# Patient Record
Sex: MALE | Race: WHITE
[De-identification: names, ages, dates, MRNs, and addresses within clinical notes are randomized per-mention and may not be internally consistent; named-entity substitution may affect disease eponyms.]

---

## 2020-09-09 ENCOUNTER — HOSPITAL ENCOUNTER (EMERGENCY)
Dept: HOSPITAL 76 - ED | Age: 60
Discharge: HOME | End: 2020-09-09
Payer: COMMERCIAL

## 2020-09-09 VITALS — SYSTOLIC BLOOD PRESSURE: 135 MMHG | DIASTOLIC BLOOD PRESSURE: 79 MMHG

## 2020-09-09 DIAGNOSIS — E11.9: ICD-10-CM

## 2020-09-09 DIAGNOSIS — M79.641: Primary | ICD-10-CM

## 2020-09-09 DIAGNOSIS — F17.200: ICD-10-CM

## 2020-09-09 DIAGNOSIS — I10: ICD-10-CM

## 2020-09-09 PROCEDURE — 73130 X-RAY EXAM OF HAND: CPT

## 2020-09-09 PROCEDURE — 99281 EMR DPT VST MAYX REQ PHY/QHP: CPT

## 2020-09-09 PROCEDURE — 99283 EMERGENCY DEPT VISIT LOW MDM: CPT

## 2020-09-09 NOTE — XRAY REPORT
PROCEDURE:  Hand 3 View RT

 

INDICATIONS:  r/o fx.  hit by wrench

 

TECHNIQUE:  3 views of the hand(s) acquired.  

 

COMPARISON:  None.

 

FINDINGS:  

 

Bones:  No acute fractures or dislocations.  Multilevel background degenerative changes of the right 
hand. No suspicious bony lesions.  

 

Soft tissues:  No suspicious soft tissue calcifications.  

 

IMPRESSION:  

Right hand without acute fracture or dislocation.

 

If there is persistent clinical concern for a radiographically occult fracture, recommend immobilizat
ion and repeat imaging in 10 to 14 days. 

 

Reviewed by: Nilton Bullock MD on 9/9/2020 8:26 PM PDT

Approved by: Nilton Bullock MD on 9/9/2020 8:26 PM PDT

 

 

Station ID:  [id number]

## 2020-09-09 NOTE — ED PHYSICIAN DOCUMENTATION
PD HPI UPPER EXT INJURY





- Stated complaint


Stated Complaint: RT HAND INJ





- Chief complaint


Chief Complaint: Ext Problem





- History obtained from


History obtained from: Patient





- History of Present Illness


Location: Right, Hand


Type of injury: Blunt / blow


Where injury occurred: Work


Similar symptoms before: Has not had sx before


Recently seen: Not recently seen





- Additonal information


Additional information: 


-year-old right-handed male presents to the emergency department with acute 

right hand pain after his hand slipped off a wrench at work today forcefully 

slamming into a stainless steel sink.  He reports that he immediately had pain 

over the second, third ,and fourth, proximal MCP joints.  However he continued 

to work normally until he went home.  He has been icing the hand but that it has

gotten progressively more swollen and he has pain when he extends the fingers he

is worried that he may have a subtle fracture.  no open sores or lesions








PD PAST MEDICAL HISTORY





- Past Medical History


Past Medical History: Yes


Cardiovascular: Hypertension


Endocrine/Autoimmune: Type 2 diabetes





- Past Surgical History


Past Surgical History: Yes


Ortho: Arthroscopic surgery





- Present Medications


Home Medications: 


                                Ambulatory Orders











 Medication  Instructions  Recorded  Confirmed


 


Ibuprofen [Motrin] 600 mg PO Q6H PRN #30 tab 09/09/20 














- Allergies


Allergies/Adverse Reactions: 


                                    Allergies











Allergy/AdvReac Type Severity Reaction Status Date / Time


 


No Known Drug Allergies Allergy   Verified 09/09/20 19:28














- Social History


Does the pt smoke?: Yes


Smoking Status: Current every day smoker


Does the pt drink ETOH?: Yes


Does the pt have substance abuse?: No





- Immunizations


Immunizations are current?: Yes





- POLST


Patient has POLST: No





PD ED PE EXPANDED





- General


General: Alert, No acute distress





- Extremities


Extremities: Tenderness, Right hand (normal felxion/extension of hand wrist 

agaist resistance.  swelling over the 2,3,4th MCP joints.  2+ radial pulse)





Results





- Vitals


Vitals: 


                               Vital Signs - 24 hr











  09/09/20 09/09/20





  19:25 20:52


 


Temperature 36.7 C 


 


Heart Rate 61 65


 


Respiratory 17 16





Rate  


 


Blood Pressure 122/71 135/79 H


 


O2 Saturation 98 99








                                     Oxygen











O2 Source                      Room air

















- Rads (name of study)


  ** right hand


Radiology: Final report received (Right hand without acute fracture or 

dislocation)





PD MEDICAL DECISION MAKING





- ED course


Complexity details: reviewed results, considered differential, d/w patient


ED course: 


59-year-old male presents with acute right hand pain after his hand slipped off 

a wrench at work and slammed forcefully into a stainless steel sink.  He has 

swelling over the second third and fourth MCP joints but normal movement of the 

hand and flexion and extension at all digits.  The x-ray does not show acute 

fracture or dislocation.  At this time I favor a contusion.  Patient was given a

 Velcro wrist blade brace for support.  I recommend NSAIDs and follow-up with 

primary care physician if symptoms not improved in 1 week.  This was a on the 

job work injury however he was able to finish work today and I feel that he 

should be able to continue working without restriction








Departure





- Departure


Disposition: 01 Home, Self Care


Clinical Impression: 


 Right hand pain





Condition: Stable


Prescriptions: 


Ibuprofen [Motrin] 600 mg PO Q6H PRN #30 tab


 PRN Reason: Pain


Comments: 


The x-ray of your hand does not show any broken bones.  I think you simply have 

a bad contusion.  Let us have you wear the wrist brace while at work and take 

ibuprofen as prescribed for the next 3 to 4 days.  I would like you to follow-up

 with your primary care doctor for further evaluation if your symptoms are not 

much better within 1 week.  However I do feel that you can return to regular 

work duty at this time.


Discharge Date/Time: 09/09/20 20:53

## 2021-03-21 ENCOUNTER — HOSPITAL ENCOUNTER (EMERGENCY)
Dept: HOSPITAL 76 - ED | Age: 61
Discharge: HOME | End: 2021-03-21
Payer: COMMERCIAL

## 2021-03-21 VITALS — SYSTOLIC BLOOD PRESSURE: 136 MMHG | DIASTOLIC BLOOD PRESSURE: 84 MMHG

## 2021-03-21 DIAGNOSIS — K92.1: Primary | ICD-10-CM

## 2021-03-21 DIAGNOSIS — F17.200: ICD-10-CM

## 2021-03-21 LAB
ALBUMIN DIAFP-MCNC: 4.2 G/DL (ref 3.2–5.5)
ALBUMIN/GLOB SERPL: 1.4 {RATIO} (ref 1–2.2)
ALP SERPL-CCNC: 46 IU/L (ref 42–121)
ALT SERPL W P-5'-P-CCNC: 27 IU/L (ref 10–60)
ANION GAP SERPL CALCULATED.4IONS-SCNC: 14 MMOL/L (ref 6–13)
AST SERPL W P-5'-P-CCNC: 17 IU/L (ref 10–42)
BASOPHILS NFR BLD AUTO: 0.1 10^3/UL (ref 0–0.1)
BASOPHILS NFR BLD AUTO: 0.5 %
BILIRUB BLD-MCNC: 1.2 MG/DL (ref 0.2–1)
BUN SERPL-MCNC: 15 MG/DL (ref 6–20)
CALCIUM UR-MCNC: 9.3 MG/DL (ref 8.5–10.3)
CHLORIDE SERPL-SCNC: 98 MMOL/L (ref 101–111)
CLARITY UR REFRACT.AUTO: CLEAR
CO2 SERPL-SCNC: 24 MMOL/L (ref 21–32)
CREAT SERPLBLD-SCNC: 0.8 MG/DL (ref 0.6–1.2)
EOSINOPHIL # BLD AUTO: 0.2 10^3/UL (ref 0–0.7)
EOSINOPHIL NFR BLD AUTO: 1.6 %
ERYTHROCYTE [DISTWIDTH] IN BLOOD BY AUTOMATED COUNT: 12.3 % (ref 12–15)
GFRSERPLBLD MDRD-ARVRAT: 98 ML/MIN/{1.73_M2} (ref 89–?)
GLOBULIN SER-MCNC: 3.1 G/DL (ref 2.1–4.2)
GLUCOSE SERPL-MCNC: 124 MG/DL (ref 70–100)
GLUCOSE UR QL STRIP.AUTO: NEGATIVE MG/DL
HCT VFR BLD AUTO: 45 % (ref 42–52)
HGB UR QL STRIP: 15.3 G/DL (ref 14–18)
INR PPP: 1.2 (ref 0.8–1.2)
KETONES UR QL STRIP.AUTO: NEGATIVE MG/DL
LIPASE SERPL-CCNC: 26 U/L (ref 22–51)
LYMPHOCYTES # SPEC AUTO: 2 10^3/UL (ref 1.5–3.5)
LYMPHOCYTES NFR BLD AUTO: 21.2 %
MCH RBC QN AUTO: 32.1 PG (ref 27–31)
MCHC RBC AUTO-ENTMCNC: 34 G/DL (ref 32–36)
MCV RBC AUTO: 94.5 FL (ref 80–94)
MONOCYTES # BLD AUTO: 0.6 10^3/UL (ref 0–1)
MONOCYTES NFR BLD AUTO: 6.6 %
NEUTROPHILS # BLD AUTO: 6.5 10^3/UL (ref 1.5–6.6)
NEUTROPHILS # SNV AUTO: 9.4 X10^3/UL (ref 4.8–10.8)
NEUTROPHILS NFR BLD AUTO: 69.8 %
NITRITE UR QL STRIP.AUTO: NEGATIVE
NRBC # BLD AUTO: 0 /100WBC
NRBC # BLD AUTO: 0 X10^3/UL
PDW BLD AUTO: 9.8 FL (ref 7.4–11.4)
PH UR STRIP.AUTO: 6 PH (ref 5–7.5)
PLATELET # BLD: 144 10^3/UL (ref 130–450)
POTASSIUM SERPL-SCNC: 4.2 MMOL/L (ref 3.5–5)
PROT SPEC-MCNC: 7.3 G/DL (ref 6.7–8.2)
PROT UR STRIP.AUTO-MCNC: NEGATIVE MG/DL
PROTHROM ACT/NOR PPP: 13.5 SECS (ref 9.9–12.6)
RBC # UR STRIP.AUTO: NEGATIVE /UL
RBC MAR: 4.76 10^6/UL (ref 4.7–6.1)
SODIUM SERPLBLD-SCNC: 136 MMOL/L (ref 135–145)
SP GR UR STRIP.AUTO: 1.01 (ref 1–1.03)
UROBILINOGEN UR QL STRIP.AUTO: (no result) E.U./DL
UROBILINOGEN UR STRIP.AUTO-MCNC: NEGATIVE MG/DL

## 2021-03-21 PROCEDURE — 86850 RBC ANTIBODY SCREEN: CPT

## 2021-03-21 PROCEDURE — 74177 CT ABD & PELVIS W/CONTRAST: CPT

## 2021-03-21 PROCEDURE — 83690 ASSAY OF LIPASE: CPT

## 2021-03-21 PROCEDURE — 87086 URINE CULTURE/COLONY COUNT: CPT

## 2021-03-21 PROCEDURE — 85025 COMPLETE CBC W/AUTO DIFF WBC: CPT

## 2021-03-21 PROCEDURE — 86901 BLOOD TYPING SEROLOGIC RH(D): CPT

## 2021-03-21 PROCEDURE — 80053 COMPREHEN METABOLIC PANEL: CPT

## 2021-03-21 PROCEDURE — 81001 URINALYSIS AUTO W/SCOPE: CPT

## 2021-03-21 PROCEDURE — 99284 EMERGENCY DEPT VISIT MOD MDM: CPT

## 2021-03-21 PROCEDURE — 85610 PROTHROMBIN TIME: CPT

## 2021-03-21 PROCEDURE — 81003 URINALYSIS AUTO W/O SCOPE: CPT

## 2021-03-21 PROCEDURE — 36415 COLL VENOUS BLD VENIPUNCTURE: CPT

## 2021-03-21 PROCEDURE — 86900 BLOOD TYPING SEROLOGIC ABO: CPT

## 2021-03-21 NOTE — CT REPORT
PROCEDURE:  Abdomen/Pelvis W

 

INDICATIONS:  rectal bleeding, diarrhea

 

CONTRAST:  IV CONTRAST: Optiray 320 ml: 100 PO CONTRAST: *NO PO CONTRAST 

 

TECHNIQUE:  

After the administration of intravenous contrast, 5 mm thick sections acquired from the diaphragms to
 the symphysis.  5 mm thick coronal and sagittal reformats were acquired.  For radiation dose reducti
on, the following was used:  automated exposure control, adjustment of mA and/or kV according to priscila
ent size.  

 

COMPARISON:  None.

 

FINDINGS:  

Image quality:  Excellent.  

 

ABDOMEN:  

Lung bases: 3 mm pulmonary nodule at the left lower lobe. No pleural effusion. Heart size is normal. 
 

 

Solid organs:  Liver and spleen are normal in size and enhancement. Small splenule. Gallbladder is un
remarkable.  Biliary system is non dilated.  Pancreas enhances normally.  No adrenal nodules.  Kidney
s demonstrate normal size and enhancement, without hydronephrosis. Small left kidney simple cyst. 

 

Peritoneum and bowel:  Bowel loops demonstrate normal wall thickness and caliber. Diverticulosis. Nor
mal appendix. No free fluid or air.  

 

Nodes and vessels:  No retroperitoneal or mesenteric adenopathy by size criteria.  Aorta and inferior
 vena cava are normal in size.  

 

Miscellaneous:  No significant ventral hernias.  

 

 

PELVIS:  

Genitourinary:  Bladder wall thickness is normal.  Bladder is distended. Right seminal vesicle is mil
dly prominent compared to the left.

 

Miscellaneous:  No inguinal hernias or adenopathy. No free fluid. 

 

Bones:  No suspicious bony lesions.  No vertebral body compression fractures.  

 

IMPRESSION:  

No acute abnormality identified. No free fluid.

Diverticulosis without diverticulitis.

 

 

Reviewed by: Khris Rodriguez MD on 3/21/2021 2:17 PM RIGOBERTO

Approved by: Khris Rodriguez MD on 3/21/2021 2:17 PM AKDT

 

 

Station ID:  [id number]

## 2021-03-21 NOTE — ED PHYSICIAN DOCUMENTATION
History of Present Illness





- Stated complaint


Stated Complaint: MALE 





- Chief complaint


Chief Complaint: General





- History obtained from


History obtained from: Patient





- History of Present Illness


Timing: Today


Pain level max: 0


Pain level now: 0





- Additonal information


Additional information: 





Patient is a 60-year-old male who presents to the emergency department 

complaining of rectal bleeding x2 today.  He states he was having diarrhea as 

well.  No pain.  The first time he states was bright red blood, the second time 

appeared darker to him.  Has never had this before.  Had a normal colonoscopy 

about 10 years ago.  He is not on any blood thinners.  No abdominal pain.  No 

vomiting.  No recent travel or antibiotics.  Currently asymptomatic.





Review of Systems


Ten Systems: 10 systems reviewed and negative


Constitutional: denies: Fever, Chills


Throat: denies: Sore throat


Cardiac: denies: Chest pain / pressure


Respiratory: denies: Cough


GI: denies: Vomiting, Hematemesis


: denies: Dysuria, Frequency, Hesitancy


Skin: denies: Rash


Musculoskeletal: denies: Neck pain, Back pain


Neurologic: denies: Headache





PD PAST MEDICAL HISTORY





- Past Medical History


Cardiovascular: Hypertension


Endocrine/Autoimmune: Type 2 diabetes





- Past Surgical History


Past Surgical History: Yes


Ortho: Arthroscopic surgery





- Present Medications


Home Medications: 


                                Ambulatory Orders











 Medication  Instructions  Recorded  Confirmed


 


Ibuprofen [Motrin] 600 mg PO Q6H PRN #30 tab 09/09/20 














- Allergies


Allergies/Adverse Reactions: 


                                    Allergies











Allergy/AdvReac Type Severity Reaction Status Date / Time


 


No Known Drug Allergies Allergy   Verified 03/21/21 13:34














- Social History


Does the pt smoke?: Yes


Smoking Status: Current every day smoker


Does the pt drink ETOH?: Yes


Does the pt have substance abuse?: No





- Immunizations


Immunizations are current?: Yes





- POLST


Patient has POLST: No





PD ED PE NORMAL





- Vitals


Vital signs reviewed: Yes





- General


General: Alert and oriented X 3, No acute distress





- HEENT


HEENT: Moist mucous membranes





- Neck


Neck: Supple, no meningeal sign





- Cardiac


Cardiac: RRR





- Respiratory


Respiratory: No respiratory distress, Clear bilaterally





- Abdomen


Abdomen: Soft, Non tender, Non distended





- Rectal


Rectal: Other (No visible hemorrhoids.  Small amount of bright red blood on the 

glove.  Otherwise normal rectal exam)





- Derm


Derm: Warm and dry





- Extremities


Extremities: No edema, No calf tenderness / cord





- Neuro


Neuro: Alert and oriented X 3





- Psych


Psych: Normal mood, Normal affect





Results





- Vitals


Vitals: 


                               Vital Signs - 24 hr











  03/21/21





  13:30


 


Temperature 36.8 C


 


Heart Rate 70


 


Respiratory 18





Rate 


 


Blood Pressure 136/84 H


 


O2 Saturation 97








                                     Oxygen











O2 Source                      Room air

















- Labs


Labs: 


                                Laboratory Tests











  03/21/21 03/21/21 03/21/21





  13:46 13:46 13:46


 


WBC   9.4 


 


RBC   4.76 


 


Hgb   15.3 


 


Hct   45.0 


 


MCV   94.5 H 


 


MCH   32.1 H 


 


MCHC   34.0 


 


RDW   12.3 


 


Plt Count   144 


 


MPV   9.8 


 


Neut # (Auto)   6.5 


 


Lymph # (Auto)   2.0 


 


Mono # (Auto)   0.6 


 


Eos # (Auto)   0.2 


 


Baso # (Auto)   0.1 


 


Absolute Nucleated RBC   0.00 


 


Nucleated RBC %   0.0 


 


PT    13.5 H


 


INR    1.2


 


Sodium   


 


Potassium   


 


Chloride   


 


Carbon Dioxide   


 


Anion Gap   


 


BUN   


 


Creatinine   


 


Estimated GFR (MDRD)   


 


Glucose   


 


Calcium   


 


Total Bilirubin   


 


AST   


 


ALT   


 


Alkaline Phosphatase   


 


Total Protein   


 


Albumin   


 


Globulin   


 


Albumin/Globulin Ratio   


 


Lipase   


 


Urine Color   


 


Urine Clarity   


 


Urine pH   


 


Ur Specific Gravity   


 


Urine Protein   


 


Urine Glucose (UA)   


 


Urine Ketones   


 


Urine Occult Blood   


 


Urine Nitrite   


 


Urine Bilirubin   


 


Urine Urobilinogen   


 


Ur Leukocyte Esterase   


 


Ur Microscopic Review   


 


Urine Culture Comments   


 


Blood Type  A NEGATIVE  


 


Antibody Screen  NEGATIVE  














  03/21/21 03/21/21





  13:46 13:53


 


WBC  


 


RBC  


 


Hgb  


 


Hct  


 


MCV  


 


MCH  


 


MCHC  


 


RDW  


 


Plt Count  


 


MPV  


 


Neut # (Auto)  


 


Lymph # (Auto)  


 


Mono # (Auto)  


 


Eos # (Auto)  


 


Baso # (Auto)  


 


Absolute Nucleated RBC  


 


Nucleated RBC %  


 


PT  


 


INR  


 


Sodium  136 


 


Potassium  4.2 


 


Chloride  98 L 


 


Carbon Dioxide  24 


 


Anion Gap  14.0 H 


 


BUN  15 


 


Creatinine  0.8 


 


Estimated GFR (MDRD)  98 


 


Glucose  124 H 


 


Calcium  9.3 


 


Total Bilirubin  1.2 H 


 


AST  17 


 


ALT  27 


 


Alkaline Phosphatase  46 


 


Total Protein  7.3 


 


Albumin  4.2 


 


Globulin  3.1 


 


Albumin/Globulin Ratio  1.4 


 


Lipase  26 


 


Urine Color   LIGHT YELLOW


 


Urine Clarity   CLEAR


 


Urine pH   6.0


 


Ur Specific Gravity   1.010


 


Urine Protein   NEGATIVE


 


Urine Glucose (UA)   NEGATIVE


 


Urine Ketones   NEGATIVE


 


Urine Occult Blood   NEGATIVE


 


Urine Nitrite   NEGATIVE


 


Urine Bilirubin   NEGATIVE


 


Urine Urobilinogen   0.2 (NORMAL)


 


Ur Leukocyte Esterase   NEGATIVE


 


Ur Microscopic Review   NOT INDICATED


 


Urine Culture Comments   NOT INDICATED


 


Blood Type  


 


Antibody Screen  














- Rads (name of study)


  ** CT abdomen pelvis


Radiology: Prelim report reviewed, EMP read contemporaneously, See rad report





PD MEDICAL DECISION MAKING





- ED course


Complexity details: reviewed results, re-evaluated patient, considered 

differential, d/w patient


ED course: 





60-year-old male with stable hematochezia today.  No abnormality on CT scan.  

Hemoglobin is 15.  No recurrent bleeding in the emergency department.  Patient 

is well-appearing, nontoxic.  Afebrile.  No chest pain.  No shortness of breath.

  We will have him follow-up with his doctor for further care and an outpatient 

colonoscopy.  Patient counseled regarding signs and symptoms for which I believe

 and urgent re-evaluation would be necessary. Patient with good understanding of

 and agreement to plan and is comfortable going home at this time





This document was made in part using voice recognition software. While efforts 

are made to proofread this document, sound alike and grammatical errors may 

occur.














IMPRESSION: 


No acute abnormality identified. No free fluid. 


Diverticulosis without diverticulitis. 





Departure





- Departure


Disposition: 01 Home, Self Care


Clinical Impression: 


 Hematochezia





Condition: Good


Instructions:  ED Hematochezia Stable


Follow-Up: 


Satya Delatorre [Primary Care Provider] - Within 1 week


Comments: 


There are no acute abnormalities on your laboratory testing or CT scan.  Please 

follow-up with your doctor for further care.  You will need a colonoscopy 

scheduled within the next 1-2 weeks.  Return if you worsen.


Discharge Date/Time: 03/21/21 16:25

## 2022-02-25 ENCOUNTER — HOSPITAL ENCOUNTER (OUTPATIENT)
Dept: HOSPITAL 76 - SC | Age: 62
Discharge: HOME | End: 2022-02-25
Attending: NURSE PRACTITIONER
Payer: COMMERCIAL

## 2022-02-25 VITALS — DIASTOLIC BLOOD PRESSURE: 95 MMHG | SYSTOLIC BLOOD PRESSURE: 127 MMHG

## 2022-02-25 DIAGNOSIS — E66.9: ICD-10-CM

## 2022-02-25 DIAGNOSIS — G47.33: Primary | ICD-10-CM

## 2022-02-25 PROCEDURE — 99203 OFFICE O/P NEW LOW 30 MIN: CPT

## 2022-02-25 PROCEDURE — 99212 OFFICE O/P EST SF 10 MIN: CPT

## 2022-02-25 NOTE — SLEEP CARE CONSULTATION
Information from patient questionnaire entered by Macy Hernandez MA.





I have reviewed and concur with the information entered by Macy Hernandez MA. 

This document represents the service I personally performed and the decisions 

made by me, Lisha Donahue ARNP.





History of Present Illness


Service Date and Time: 2022    1541


Reason for Visit: New patient (ONSET 2012, STOPPED CPAP 1 YEAR AGO,), 

Previously diagnosed sleep apnea


Chief Complaint: reports: Unrefreshed sleep, Snoring, Excessive daytime 

sleepiness, Observed pauses in breathing, Fatigue, Other (needs supplies)


Date of Onset: 20 YEARS


Usual bedtime: 930 PM


Time it takes to fall asleep: NOT LONG


Snores at night: Yes


Observed to quit breathing while asleep: Yes


Sleeps alone due to snoring: No


Number of times waking at night: 2


Reasons for waking at night: reports: Snoring, Gasping for air, Bathroom


Toss, Turn, or Twitch while sleeping: Yes


Recalls having dreams: Yes


Usually gets out of bed at: 0600


Feels refreshed in the morning: No


Morning headache: No


Sleepy or fatigued during the day: Yes


Ever fallen asleep while driving: No


Takes day naps: No (rarely)


Dreams during day naps: Yes


Prior sleep studies: Yes


Year and Where: [facility] ; [facility]


Additional HPI information: 





I had the pleasure of seeing SAWYER FINLEY today regarding the possibility of 

him having a sleep disorder. He was previously diagnosed with sleep apnea but 

has not been using the CPAP for a 4-5 years. His current complaints are 

excessive daytime sleepiness, fatigue, observed pauses in breathing, snoring, 

unrefreshed sleep and needs supplies. 


Patient states he lost his insurance and that his CPAP broke.  He was unable to 

get it replaced or afford supplies. He has not been able to use his CPAP for the

last 4 to 5 years.  He finally obtained insurance and is here to see if he can 

get back on the CPAP if needed.  Patient has lost about 40 pounds since his last

sleep study.  He states he was so severe they woke him up and put him on a CPAP 

pretty quickly after he fell asleep during the study. He does not voice any 

issues that he had using CPAP before it broke.  He feels has improved but still 

has daytime fatigue, loud snoring and unrefreshed sleep. 





- Parasomnia Symptoms


Ever been unable to move upon waking from sleep: No


Walks in sleep: Yes (when younger)


Talks in sleep: Yes


Ever acted out dreams in sleep: Yes (not since he was younger)


Ever felt weak in the knees when startled or emotional: No


Bothered by creepy, crawly, restless sensations in legs: No


Problems with memory or concentration: Yes (not severe, just when tired)





CPAP Compliance Data


Compliance data discussion: 





He states he was using his cpap until it broke and because he did not have 

insurance, he could not replace it or get supplies. 





Subjective


Initial Gay[place] Sleepiness Scale score: 20 ()





Past Medical History


Past Medical History: reports: Diabetes





Social History


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 Patient is  and lives in [place]. 





Have you smoked in the past 12 months: Yes


Cigarettes per day (20/pack): 10


Years of smokin


Smoking Pack Years: 10.0


Alcohol use: Yes


Alcohol amount and frequency: 1 X YEARLY


Caffeine use: Yes


Caffeine amount and frequency: 1 X DAILY





Family History


Family history of sleep disordered breathing: Yes


Family Hx Sleep Apnea: Father: Snoring, Sleep apnea - Untreated





Allergies and Home Medications


Known drug allergies: No


Drug allergies reviewed: Yes (NKDA)


Home medication list reviewed: Yes


Allergy and home medication list: 


Allergies





No Known Drug Allergies Allergy (Verified 21 13:34)


   


Medications:





Metformin


Losartin


Rosuvastatin


Tradjenta








Review of Systems


Weight gain over past 5 years: 20


Weight loss over past 5 years: 40


Respiratory: reports: wheeze


Ear/Nose/Throat: denies: tonsillectomy


Musculoskeletal: reports: joint pain


Immunologic: reports: rash, itching





Physical Exam


Vital signs obtained and entered by: MURALI Hernandez CMA AAMA


Blood Pressure: 127/95 (RIGHT, PULSE 73, RESP 16, )


Cuff size: wrist


Heart Rate: 71


O2 Saturation: 96


Height: 6 ft


Weight: 265 lb


Body Mass Index: 35.9


BMI Classification: Obese


Heart: regular rate and rhythm


Lungs: clear bilaterally





Impression and Plan





1. Suspected Obstructive Sleep Apnea-Hypopnea Syndrome, as previously diagnosed 

and as suggested by a history of loud and irregular snoring, observed cessation 

of breath while asleep, gasping or choking in sleep, unrefreshed sleep, 

cognitive impairment, and excessive daytime sleepiness. I recommend proceeding 

to polysomnography to confirm the diagnosis and to assess severity. If the 

patient has significant sleep disordered breathing, a manual CPAP titration 

study will also be performed to find the optimal treatment pressure. I informed 

the patient of what the sleep studies involve and after some discussion, 

obtained agreement to proceed. The pathophysiology of obstructive sleep apnea-

hypopnea syndrome was discussed with the patient and health risks of 

cardiovascular and cerebrovascular disease if not treated.  Risks of drowsy 

driving discussed in detail and patient advised to avoid long distance driving 

and to pull over at the first sign of drowsiness. Patient agreed to plan. 





* Schedule polysomnography +- manual CPAP titration study and return in 1-2 

  weeks after the study to discuss result and initiate therapy.


* Avoid long distance driving or driving when feeling sleepy.


* Avoid alcohol, sedative and muscle relaxant around bedtime.


* Attempt to lose weight.


* Review instructions provided by trained office staff on how to prepare for the

  sleep study.


* Return for follow-up after sleep study completed.








Counseling Topics: Weight loss health impact


Visit Type: In Office


Time Spent with Patient (minutes): 31


Provider Statement: I spent 100% of the Face to Face Visit with the patient with

greater than 50% spent counseling the patient and coordination of care.

## 2022-03-24 ENCOUNTER — HOSPITAL ENCOUNTER (OUTPATIENT)
Dept: HOSPITAL 76 - SC | Age: 62
Discharge: HOME | End: 2022-03-24
Attending: NURSE PRACTITIONER
Payer: COMMERCIAL

## 2022-03-24 DIAGNOSIS — G47.33: Primary | ICD-10-CM

## 2022-03-24 DIAGNOSIS — R09.02: ICD-10-CM

## 2022-03-24 PROCEDURE — 95806 SLEEP STUDY UNATT&RESP EFFT: CPT

## 2022-04-08 ENCOUNTER — HOSPITAL ENCOUNTER (OUTPATIENT)
Dept: HOSPITAL 76 - SC | Age: 62
Discharge: HOME | End: 2022-04-08
Attending: NURSE PRACTITIONER
Payer: COMMERCIAL

## 2022-04-08 VITALS — SYSTOLIC BLOOD PRESSURE: 129 MMHG | DIASTOLIC BLOOD PRESSURE: 77 MMHG

## 2022-04-08 DIAGNOSIS — E66.9: ICD-10-CM

## 2022-04-08 DIAGNOSIS — G47.33: Primary | ICD-10-CM

## 2022-04-08 PROCEDURE — 99213 OFFICE O/P EST LOW 20 MIN: CPT

## 2022-04-08 PROCEDURE — 99212 OFFICE O/P EST SF 10 MIN: CPT

## 2022-04-08 NOTE — SLEEP CARE CONSULTATION
Information from patient questionnaire entered by Macy Hernandez MA.





I have reviewed and concur with the information entered by Macy Hernandez MA. 

This document represents the service I personally performed and the decisions 

made by me, Lisha Donahue ARNP.





History of Present Illness


Service Date and Time: 04/08/2022    1616


Initial [place] Sleepiness Scale score: 20 (2022)


Current [place] Sleepiness Scale score: 20


Additional HPI information: 





SAWYER FINLEY returns for follow up and results of the recently performed home 

sleep study.





I explained the pathophysiology behind obstructive sleep apnea. We then spent 

quite a bit of time discussing different treatment options.  For mild 

obstructive sleep apnea, surgery and oral appliance are alternatives to nasal 

CPAP therapy but in moderate or severe cases, nasal CPAP is the most effective 

and reliable treatment.   





Because apnea is primarily in supine position, then positional management 

therapy could be effective. Methods discussed such as positioning with pillows 

to prevent supine sleep. I reviewed the impact of weight changes on sleep apnea 

and strongly recommended losing weight. After some discussion, the patient opted

to go with the nasal CPAP therapy.  Nasal autoCPAP set at 4-15 cmH20 will be 

ordered with rationale explained. A manual titration study will be ordered if 

unable to find optimal pressure with office adjustments. 





I explained how CPAP machine works and what to expect when using the machine.  

Using CPAP every night in order to get used to it was emphasized.  Patient 

advised to put CPAP mask on before getting into bed so as not to fall asleep 

without CPAP.  To assist acclimation to CPAP use, it could also be used for a 

short time during day while reading or watching TV. The patient was instructed 

to call the CPAP supplier to discuss any mechanical problem that may occur. If 

the mask given is uncomfortable or is difficult to keep on through the night 

even with adjustment, contact the CPAP supplier as many will replace with 

another mask style if notified before  30 days.  If snoring or perceives is not 

getting enough air or too much air from the machine, notify this office.  [facility] 

patient education PAP tips reviewed and given to patient. Patient counseled not 

drink alcohol less than 4 hours before bedtime as it can increase snoring and 

apnea. Patient was cautioned about risks of drowsy driving until sleepiness 

symptoms resolve. Patient denies drowsy driving. 





Sleep Study





- Results


Type of Sleep Study: Home sleep study (F/U HOME STUDY, 3/24/22 [facility],)


Prior sleep studies: Yes


Year and Where: [place] 1994; [facility]


Polysomnography/Home Sleep Study results: 





Physician Impression:


The quality of the study is good. The length of the study is adequate (> 240 

minutes). Please also see the


tabulated and graphic data.


1. Obstructive Sleep Apnea-Hypopnea (ICD-10 G47.33), severe, with an AHI of 

52.0/hr and khanh SaO2


of 75%. During the study, the patient had 369 apneas (368 obstructive, 0 

central, 1 mixed) and 38


hypopneas. The longest episode lasted 204.5 seconds. The respiratory events 

occurred slightly more


frequently during supine sleep (supine AHI was 66.1 and non-supine, 42.21).


2. Hypoxemia (ICD-10 R09.02), moderate, with the lowest oxygen saturation of 75 

% and 52.4 minutes


with SaO2 under 90%. Baseline oxygen saturation was normal (Average oxygen 

saturation was 92%). 





Allergies and Home Medications


Home medication list reviewed: Yes (no changes)


Allergy and home medication list: 


Allergies





No Known Drug Allergies Allergy (Verified 03/21/21 13:34)


   





Review of Systems


Review of systems same as previous: Yes (no changes)





Physical Exam


Vital signs obtained and entered by: DIANA CHAVIRA


Blood Pressure: 129/77 (left)


Cuff size: wrist


Heart Rate: 68


O2 Saturation: 96


Height: 6 ft


Weight: 255 lb


Body Mass Index: 34.5


BMI Classification: Obese





Impression and Plan





1. Obstructive Sleep Apnea-Hypopnea Syndrome, severe, with lowest oxygen 

saturation of 75%. Obviously this is the cause of the patients symptoms of 

unrefreshed sleep, and excessive daytime sleepiness. Positive pressure therapy 

could benefit diabetes. As mentioned above, the patient will be started on nasal

autoCPAP therapy with pressure set at 4-15 cmH2O. A manual titration study will 

be completed if unable to find optimal treatment pressure with office 

adjustments. Compliance guidelines also reviewed. A copy of compliance 

guidelines will be given for reference at check out. Because the apnea is more 

severe supine, I instructed to avoid sleeping supine using pillow positioning 

until able to start CPAP use. 





2. Hypoxemia, moderate, with the lowest oxygen saturation of 75 % and 52.4 

minutes with SaO2 under 90%. His baseline oxygen saturation was normal with an 

average oxygen saturation of 92%. 





* Nasal auto CPAP therapy, pressure at 4-15 cm H2O.


* Attempt to lose weight.


* Avoid alcohol consumption near bedtime.


* Avoid supine sleep until using CPAP.


* The patient is again cautioned about driving until sleepiness completely 

  resolves.


* Return one month after CPAP obtained. I will assess response to therapy and 

  compliance at that time.





Counseling Topics: Sleeping position, Weight loss health impact


Visit Type: In Office


Time Spent with Patient (minutes): 20


Provider Statement: I spent 100% of the Face to Face Visit with the patient with

greater than 50% spent counseling the patient and coordination of care.